# Patient Record
(demographics unavailable — no encounter records)

---

## 2024-11-26 NOTE — DISCUSSION/SUMMARY
[FreeTextEntry1] : 4y F seen for follow up. URI resolved. Counseled re: vaccinations. Proquad and Quadracel today.

## 2024-11-26 NOTE — HISTORY OF PRESENT ILLNESS
[de-identified] : follow up 4 year vaccines  [FreeTextEntry6] : recovered from URI, here for recheck and vaccines

## 2024-11-26 NOTE — HISTORY OF PRESENT ILLNESS
[de-identified] : follow up 4 year vaccines  [FreeTextEntry6] : recovered from URI, here for recheck and vaccines

## 2025-06-11 NOTE — HISTORY OF PRESENT ILLNESS
[de-identified] : As per mom, patient developed white thick vaginal discharge since this morning and vaginal opening looks irritated  [FreeTextEntry6] : intermittent vaginal burning, itching. sometimes has a white or pale yellow discharge. intermittent strong odor. Sometimes says it hurts she urinates but only for a second/not always happening/usually comfortable when she is urinating. Stands to shower. Toilet trained day and night. Sometimes doesn't wipe herself well at school. Mom wipes her at home.

## 2025-06-11 NOTE — PHYSICAL EXAM
[Shelton: ____] : Shelton [unfilled] [Erythematous Labia Minora] : erythematous labia minora [NL] : moves all extremities x4, warm, well perfused x4 [FreeTextEntry9] : no masses [de-identified] : erythema and chapping of labia majora

## 2025-06-11 NOTE — DISCUSSION/SUMMARY
[FreeTextEntry1] : 4y F seen for acute visit. UA clean. UCx sent. Normal exam except for external genital irritation. Perineal care reviewed. RTO PRN